# Patient Record
Sex: MALE | Race: WHITE | NOT HISPANIC OR LATINO | Employment: FULL TIME | URBAN - METROPOLITAN AREA
[De-identification: names, ages, dates, MRNs, and addresses within clinical notes are randomized per-mention and may not be internally consistent; named-entity substitution may affect disease eponyms.]

---

## 2017-11-19 ENCOUNTER — OFFICE VISIT (OUTPATIENT)
Dept: URGENT CARE | Facility: MEDICAL CENTER | Age: 22
End: 2017-11-19
Payer: COMMERCIAL

## 2017-11-19 PROCEDURE — 90715 TDAP VACCINE 7 YRS/> IM: CPT

## 2017-11-19 PROCEDURE — 99202 OFFICE O/P NEW SF 15 MIN: CPT

## 2017-11-20 NOTE — PROGRESS NOTES
Assessment    1  Laceration of thumb, right (883 0) ([id number])    Discussion/Summary  Discussion Summary:   Patient has less than 1 cm laceration of the right thumb involving the palmar aspect of the tip of the distal phalanx and extending into the distal part of the nail which is subcutaneous in depth and not involving deeper tissues  It was closed with Dermabond without any immediate complications  Dressing applied  to keep area clean and dry  If area gets wet or dirty, wash with soap and water and apply a dressing if needed  Do not rub  Pat dry only  for any signs of infection and if any redness, swelling or pus discharge from the area, advised to seek immediate medical attention  Tetanus shot updated  Medication Side Effects Reviewed: Possible side effects of new medications were reviewed with the patient/guardian today  Understands and agrees with treatment plan: The treatment plan was reviewed with the patient/guardian  The patient/guardian understands and agrees with the treatment plan      Chief Complaint    1  Skin Wound  Chief Complaint Free Text Note Form: Pt states while expanding door handle opening last night with chisel and hammer, chisel slipped hitting top of right thumb  Pt with superficial laceration 1 1/2 cm in length on tip of thumb  No active bleeding noted  Thumb soaking in betadine and saline  Unsure of last tetanus         History of Present Illness  HPI: Patient is a 23year old male with no PMH presenting with a thumb laceration  Patient was installing door knobs 14 hours ago while using a chisel when he slipped and drove the metal chisel into his right thumb  Patient applied hydrogen peroxide, Neosporin and a bandage to the wound  Patient reports feeling lightheaded following the incident but denies fevers, chills, nausea, vomiting, pain to the area, discharge from the area, numbness or tingling in the thumb  Patient cannot recall the date of his last Tdap     Hospital Based Practices Required Assessment:  Pain Assessment  the patient states they do not have pain  (on a scale of 0 to 10, the patient rates the pain at 0 )   Prefered Language is  english  Primary Language is  english  Review of Systems  Focused-Male:  Constitutional: no fever-- and-- no chills  Gastrointestinal: no nausea,-- no vomiting-- and-- no diarrhea  Neurological: no numbness-- and-- no tingling  Past Medical History  1  No pertinent past medical history    Social History   · Denies alcohol consumption (V49 89) (Z78 9)   · Never a smoker    Current Meds   1  No Reported Medications Recorded    Allergies    1  No Known Drug Allergies    Vitals  Signs   Recorded: [id number] 09:06AM   Temperature: 97 2 F  Heart Rate: 68  Respiration: 20  Systolic: 781  Diastolic: 70  Height: 5 ft 8 in  Weight: 149 lb   BMI Calculated: 22 66  BSA Calculated: 1 8  O2 Saturation: 100  Pain Scale: 0    Physical Exam   Constitutional  General appearance: No acute distress, well appearing and well nourished  Pulmonary  Respiratory effort: No increased work of breathing or signs of respiratory distress  Auscultation of lungs: Clear to auscultation  Cardiovascular  Auscultation of heart: Normal rate and rhythm, normal S1 and S2, without murmurs  Skin  Skin and subcutaneous tissue: Abnormal  -- 1 cm superficial laceration palmar aspect of right phalanx extending beneath nail bed  Neurologic  Sensation: No sensory loss  -- Sensation in thumbs equal b/l  Procedure   Procedure: wound repair  The wound was located on the and was 1 cm in length right 1 finger(s)  The wound was simple  The wound involved the subcutaneous tissue  The wound was linear-- and-- was clean, but-- did not have a foreign body  the neurovascular exam was normal, but-- there was no sensory deficit,-- there was no motor deficit-- and-- there was no vascular deficit  there was no tendon deficit  The site was prepped with Betadine  Closure:  The cutaneous layer was closed with a skin adhesive  Dressing: a sterile dressing was placed  Patient Status:  the patient tolerated the procedure well        Signatures   Electronically signed by : DEEPA Torres ; Nov 19 2017  1:22PM EST                       (Author)